# Patient Record
Sex: FEMALE | Race: WHITE | NOT HISPANIC OR LATINO | URBAN - NONMETROPOLITAN AREA
[De-identification: names, ages, dates, MRNs, and addresses within clinical notes are randomized per-mention and may not be internally consistent; named-entity substitution may affect disease eponyms.]

---

## 2021-04-28 ENCOUNTER — IMPORTED ENCOUNTER (OUTPATIENT)
Dept: URBAN - NONMETROPOLITAN AREA CLINIC 1 | Facility: CLINIC | Age: 76
End: 2021-04-28

## 2021-04-28 PROBLEM — H25.813: Noted: 2021-04-28

## 2021-04-28 PROCEDURE — 92004 COMPRE OPH EXAM NEW PT 1/>: CPT

## 2021-04-28 NOTE — PATIENT DISCUSSION
Cataract(s)-Visually significant cataract OU .-Cataract(s) causing symptomatic impairment of visual function not correctable with a tolerable change in glasses or contact lenses lighting or non-operative means resulting in specific activity limitations and/or participation restrictions including but not limited to reading viewing television driving or meeting vocational or recreational needs. -Expectation is clearer vision and functional improvement in symptoms as well as reduced glare disability after cataract removal.-Order IOLMaster and OPD today. -Recommend Stand/Trad based on today's OPD testing and lifestyle questionnaire.-All questions were answered regarding surgery including pre and post-op medications appointments activity restrictions and anesthetic usage.-The risks benefits and alternatives and special risk factors for the patient were discussed in detail including but not limited to: bleeding infection retinal detachment vitreous loss problems with the implant and possible need for additional surgery.-Although rare the possibility of complete vision loss was discussed.-The possible need for glasses post-operatively was discussed.-Order medical clearance exam based on history of Diabetes-Patient elects to proceed with cataract surgery OS . Will schedule at patient's convenience and re-evaluate OD  in the future. Discussed all lens w/ patient. Pt elects Stand/Trad OU. Explained to patient the need for bifcoals s/p surgery. Patient currently being treated by Dr. Cary Capellan @ [facility]. Discussed with her the need to proceed w/ CE OU so Dr. Verna Yu can have a better view of her retina in order to proceed w/ treatment. Poor view of retina 2nd to dense cataract.

## 2021-05-05 ENCOUNTER — IMPORTED ENCOUNTER (OUTPATIENT)
Dept: URBAN - NONMETROPOLITAN AREA CLINIC 1 | Facility: CLINIC | Age: 76
End: 2021-05-05

## 2021-05-05 PROBLEM — H25.813: Noted: 2021-04-28

## 2021-05-05 PROBLEM — E11.9: Noted: 2021-05-05

## 2021-05-05 PROBLEM — H25.813: Noted: 2021-05-05

## 2021-05-05 PROBLEM — E78.5: Noted: 2021-05-05

## 2021-05-05 PROBLEM — I10: Noted: 2021-05-05

## 2021-06-07 ENCOUNTER — IMPORTED ENCOUNTER (OUTPATIENT)
Dept: URBAN - NONMETROPOLITAN AREA CLINIC 1 | Facility: CLINIC | Age: 76
End: 2021-06-07

## 2021-06-07 PROBLEM — Z01.818: Noted: 2021-06-07

## 2021-06-07 PROBLEM — I10: Noted: 2021-05-05

## 2021-06-07 PROBLEM — E78.5: Noted: 2021-05-05

## 2021-06-07 PROBLEM — E11.9: Noted: 2021-05-05

## 2021-06-07 NOTE — PATIENT DISCUSSION
Cataract(s)-Visually significant cataract OD . -Cataract(s) causing symptomatic impairment of visual function not correctable with a tolerable change in glasses or contact lenses lighting or non-operative means resulting in specific activity limitations and/or participation restrictions including but not limited to reading viewing television driving or meeting vocational or recreational needs. -Expectation is clearer vision and functional improvement in symptoms as well as reduced glare disability after cataract removal.-Recommend Stand/Trad based on previous OPD testing and lifestyle questionnaire.-All questions were answered regarding surgery including pre and post-op medications appointments activity restrictions and anesthetic usage.-The risks benefits and alternatives and special risk factors for the patient were discussed in detail including but not limited to: bleeding infection retinal detachment vitreous loss problems with the implant and possible need for additional surgery.-Although rare the possibility of complete vision loss was discussed.-The need for glasses post-operatively was discussed.-Patient elects to proceed with cataract surgery OD . Will schedule at patient's convenience. s/p PCIOL CE OS Stand/Trad + Dex -Pt doing well s/p PCIOL. -Continue post-op gtts according to instruction sheet and sleep with eye shield over eye for 7 nights.-Avoid bending at the waist lifting anything over 5lbs and dirty or chuyita environments.

## 2021-06-18 ENCOUNTER — IMPORTED ENCOUNTER (OUTPATIENT)
Dept: URBAN - NONMETROPOLITAN AREA CLINIC 1 | Facility: CLINIC | Age: 76
End: 2021-06-18

## 2021-06-18 PROBLEM — Z98.41: Noted: 2021-06-18

## 2021-06-18 PROBLEM — Z98.42: Noted: 2021-06-18

## 2021-06-18 PROCEDURE — 66982 XCAPSL CTRC RMVL CPLX WO ECP: CPT

## 2021-06-18 PROCEDURE — 92136 OPHTHALMIC BIOMETRY: CPT

## 2021-06-18 PROCEDURE — 99024 POSTOP FOLLOW-UP VISIT: CPT

## 2021-06-18 PROCEDURE — 0356T INSERTION OF DRUG-ELUTING IMPLANT (INCLUDING PUNCTAL DILATION AND IMPLANT REMOVAL WHEN PERFORMED) INTO LACRIMAL CANALICULUS, EACH: CPT

## 2021-06-18 NOTE — PATIENT DISCUSSION
s/p PCIOL Same Day POV CE OD Stand/Trad -Pt doing well s/p PCIOL. -Continue post-op gtts according to instruction sheet and sleep with eye shield over eye for 7 nights.-Avoid bending at the waist lifting anything over 5lbs and dirty or chuyita environments. -RTC .s/p PCIOLCE OS Stand/Trad -Pt doing well at 2 week  s/p PCIOL. -Continue post-op gtts according to instruction sheet.-Okay to resume usual activites and d/c eye shield.

## 2021-06-21 PROBLEM — H25.811: Noted: 2021-06-21

## 2021-06-21 PROBLEM — Z98.42: Noted: 2021-06-21

## 2021-07-01 ENCOUNTER — IMPORTED ENCOUNTER (OUTPATIENT)
Dept: URBAN - NONMETROPOLITAN AREA CLINIC 1 | Facility: CLINIC | Age: 76
End: 2021-07-01

## 2021-07-01 PROCEDURE — 99024 POSTOP FOLLOW-UP VISIT: CPT

## 2021-07-01 NOTE — PATIENT DISCUSSION
2 week PO CE OD 6/18/21 CE OD 6/4/21  standard dextenzaThe patient has undergone successful cataract extraction with Intraocular lens implantation in the right / left eye. PO examination is normal and visual acuity has improved. The patient has been instructed to call the office immediately if here is increased redness pain or vision loss. Ocular meds plan discussed and recommended they taper drops as instructed. Patient should return in 3 week for follow up w/ MR.

## 2021-07-29 ENCOUNTER — IMPORTED ENCOUNTER (OUTPATIENT)
Dept: URBAN - NONMETROPOLITAN AREA CLINIC 1 | Facility: CLINIC | Age: 76
End: 2021-07-29

## 2021-07-29 PROBLEM — Z98.41: Noted: 2021-07-29

## 2021-07-29 PROBLEM — Z98.42: Noted: 2021-07-29

## 2021-07-29 PROCEDURE — 92015 DETERMINE REFRACTIVE STATE: CPT

## 2021-07-29 PROCEDURE — 99024 POSTOP FOLLOW-UP VISIT: CPT

## 2021-07-29 NOTE — PATIENT DISCUSSION
6 week POV CE OD 6/18/21 POV CE OS 6/4/21 Standard w/detenza- Discussed diagnosis in detail with patient. - Patient doing well and stable. - Finished post op drops as directed. - MR done today; new glasses Rx given. - Continue to monitor.

## 2022-04-09 ASSESSMENT — TONOMETRY
OS_IOP_MMHG: 15
OD_IOP_MMHG: 15
OS_IOP_MMHG: 15
OD_IOP_MMHG: 13
OD_IOP_MMHG: 13
OS_IOP_MMHG: 12
OD_IOP_MMHG: 15
OS_IOP_MMHG: 13
OS_IOP_MMHG: 14
OD_IOP_MMHG: 15

## 2022-04-09 ASSESSMENT — VISUAL ACUITY
OD_CC: 20/30-2
OS_SC: 20/200
OD_CC: 20/80-1
OS_PH: 20/80
OD_CC: 20/80-1
OD_GLARE: 20/400
OS_PH: 20/80
OD_PAM: 20/30
OD_PH: 20/40-2
OD_PH: 20/40-2
OD_PAM: 20/30
OD_SC: 20/100
OD_GLARE: 20/400
OS_CC: 20/30-2
OD_CC: 20/200+
OD_PH: 20/40-2
OD_GLARE: 20/400
OD_PAM: 20/30
OS_CC: 20/25-2
OS_CC: 20/100-2
OD_GLARE: 20/400
OD_PH: 20/40-2
OS_CC: 20/25-
OS_CC: 20/25
OD_PAM: 20/30
OD_CC: 20/30-